# Patient Record
Sex: FEMALE | Race: WHITE
[De-identification: names, ages, dates, MRNs, and addresses within clinical notes are randomized per-mention and may not be internally consistent; named-entity substitution may affect disease eponyms.]

---

## 2019-01-25 NOTE — EKG REPORT
SEVERITY:- BORDERLINE ECG -

SINUS RHYTHM

BORDERLINE LEFT AXIS DEVIATION

BORDERLINE T ABNORMALITIES, ANTERIOR LEADS

:

Confirmed by: Veronica Lam MD 25-Jan-2019 22:54:57

## 2019-01-25 NOTE — PROGRESS NOTE
Provider Note


Provider Note: 





Patient was scheduled for an EGD and colonoscopy today.


she went to ED with concerns about her potassium.


I was made aware that patient was in ED by Dr Pereira who is in charge of 

Anesthesia today.


I was told that her potassium was low, Dr Pereira stated that patient will be 

having an EKG and that he is checking her labs and that it would be replaced.


A little after that, Dr Pereira informed me that patient had expressed the fact 

that she did not want to proceed with her procedures due to her significant 

anxiety;


the decision was then made to cancel her procedure..


will see the patient as an outpatient, but I do suspect that this same scenario 

will be repeated at the next attempt.

## 2019-01-25 NOTE — ER DOCUMENT REPORT
ED General





- General


Chief Complaint: Anxiety


Stated Complaint: ANXIETY


Time Seen by Provider: 01/25/19 09:34


Primary Care Provider: 


ERLINDA COLLINS [Primary Care Provider] - Follow up as needed


TRAVEL OUTSIDE OF THE [place] IN LAST 30 DAYS: No





- HPI


Patient complains to provider of: Anxiety cramping


Notes: 





Patient was scheduled for a colonoscopy EGD earlier this morning patient states 

she has been performed the prep however woke up this morning very anxious also 

having some cramping in her hands.  Patient states she had a small amount of 

vomiting early this morning patient states she did complete the entire prep.  

Patient otherwise is resting comfortably upon my evaluation.  Patient states she

was recently started on potassium supplements as that her doctor performed lab 

work showing a potassium of 2.  Patient states she states 10 mEq a day however 

has not taken those in the last 3 days.  Patient was to be no obvious distress





- Related Data


Allergies/Adverse Reactions: 


                                        





aspirin Allergy (Severe, Verified 01/25/19 08:40)


   Hemorrhage


montelukast [From Singulair] Allergy (Severe, Verified 01/25/19 08:40)


   


valsartan [From Diovan] Allergy (Severe, Verified 01/25/19 08:40)


   


levofloxacin [From Levaquin] Allergy (Intermediate, Verified 01/25/19 08:40)


   VOMITING


paroxetine [From Paxil] Allergy (Intermediate, Verified 01/25/19 08:40)


   Hallucinations


COSTUME JEWELRY METAL Allergy (Uncoded 01/25/19 08:40)


   











Past Medical History





- Social History


Smoking Status: Never Smoker


Family History: Reviewed & Not Pertinent


Patient has suicidal ideation: No


Patient has homicidal ideation: No





- Past Medical History


Cardiac Medical History: Reports: Hx Hypertension


   Denies: Hx Coronary Artery Disease, Hx Heart Attack


Pulmonary Medical History: Reports: Hx Asthma - ALLERGEN


   Denies: Hx Bronchitis, Hx COPD, Hx Pneumonia


Neurological Medical History: Denies: Hx Cerebrovascular Accident, Hx Seizures


Renal/ Medical History: Denies: Hx Peritoneal Dialysis


Musculoskeletal Medical History: Denies Hx Arthritis





- Immunizations


Hx Diphtheria, Pertussis, Tetanus Vaccination: Yes - NOT UP TO DATE





Review of Systems





- Review of Systems


Constitutional: No symptoms reported


EENT: No symptoms reported


Cardiovascular: No symptoms reported


Respiratory: No symptoms reported


Gastrointestinal: No symptoms reported


Genitourinary: No symptoms reported


Female Genitourinary: No symptoms reported


Musculoskeletal: No symptoms reported


Skin: No symptoms reported


Hematologic/Lymphatic: No symptoms reported


Neurological/Psychological: Anxiety, Other - Cramping


-: Yes All other systems reviewed and negative





Physical Exam





- Vital signs


Vitals: 


                                        











Temp Pulse Resp BP Pulse Ox


 


 97.7 F   158 H  16   140/72 H  100 


 


 01/25/19 08:43  01/25/19 08:43  01/25/19 08:43  01/25/19 08:43  01/25/19 08:43











Interpretation: Normal





- General


General appearance: Appears well, Alert





- HEENT


Head: Normocephalic, Atraumatic


Eyes: Normal


Pupils: PERRL





- Respiratory


Respiratory status: No respiratory distress


Chest status: Nontender


Breath sounds: Normal


Chest palpation: Normal





- Cardiovascular


Rhythm: Regular


Heart sounds: Normal auscultation


Murmur: No





- Abdominal


Inspection: Normal


Distension: No distension


Bowel sounds: Normal


Tenderness: Nontender


Organomegaly: No organomegaly





- Back


Back: Normal, Nontender





- Extremities


General upper extremity: Normal inspection, Nontender, Normal color, Normal ROM,

Normal temperature


General lower extremity: Normal inspection, Nontender, Normal color, Normal ROM,

Normal temperature, Normal weight bearing.  No: Darrell's sign





- Neurological


Neuro grossly intact: Yes


Cognition: Normal


Orientation: AAOx4


[place] Coma Scale Eye Opening: Spontaneous


[place] Coma Scale Verbal: Oriented


[place] Coma Scale Motor: Obeys Commands


[place] Coma Scale Total: 15


Speech: Normal


Motor strength normal: LUE, RUE, LLE, RLE


Sensory: Normal





- Psychological


Associated symptoms: Normal affect, Normal mood





- Skin


Skin Temperature: Warm


Skin Moisture: Dry


Skin Color: Normal





Course





- Re-evaluation


Re-evalutation: 





01/25/19 15:24


Laboratory studies show a low potassium of 3 and a magnesium of 1.4.  Patient 

was given oral potassium and intravenous magnesium as that we were notified by 

the GI and anesthesia team the patient would not be undergoing her EGD or 

colonoscopy today because these a little abnormalities.  At the replacement 

patient feeling a symptomatic patient was encouraged to continue her home 

medications we will add mag to her medication regiment for repletion patient 

discharged home





- Vital Signs


Vital signs: 


                                        











Temp Pulse Resp BP Pulse Ox


 


 98.2 F   76   16   136/74 H  100 


 


 01/25/19 13:37  01/25/19 13:37  01/25/19 13:37  01/25/19 13:37  01/25/19 13:37














- Laboratory


Result Diagrams: 


                                 01/25/19 09:28





                                 01/25/19 09:28


Laboratory results interpreted by me: 


                                        











  01/25/19 01/25/19 01/25/19





  09:28 09:28 09:28


 


Seg Neutrophils %  80.0 H  


 


Lymphocytes %  11.8 L  


 


Sodium   136.4 L 


 


Potassium   3.0 L* 


 


Chloride   93 L 


 


Est GFR (Non-Af Amer)   58 L 


 


Glucose   144 H 


 


Magnesium    1.2 L*














Discharge





- Discharge


Clinical Impression: 


 Hypokalemia, Hypomagnesemia





Condition: Good


Disposition: HOME, SELF-CARE


Instructions:  Anxiety ([facility]), Hypokalemia ([facility])


Additional Instructions: 


Your evaluation today shows low potassium and low magnesium exam tabs as 

prescribed please continue with your potassium replacement tablets as previous 

prescribed by your physician follow-up with your GI specialist return to the ER 

symptoms worsen.


Prescriptions: 


Magnesium Oxide [Mag-Ox 400 mg Tablet] 400 mg PO DAILY #30 tablet


Forms:  Parent Work Note, Return to School


Referrals: 


ERLINDA COLLINS [Primary Care Provider] - Follow up as needed

## 2019-03-04 NOTE — ER DOCUMENT REPORT
ED General





- General


Chief Complaint: Other


Stated Complaint: ABNORMAL LABS


Time Seen by Provider: 03/03/19 21:59


Primary Care Provider: 


GUZMAN MULLINS NP [Primary Care Provider] - Follow up in 3-5 days


Cannot obtain history due to: Other - Very poor historian


Notes: 





Patient is a 53-year-old female with a past medical history of essential 

hypertension, presents with a multitude of complaints.  It is difficult to 

ascertain what exactly is bringing the patient to the emergency department ada kiran.  As I sit in the room the patient continues to add additional complaints 

that were not initially presented to nursing staff.  Initially the patient was 

complaining to me primarily of urinating frequently throughout the day today.  

She denies any associated dysuria, abdominal pain, flank pain, hematuria or 

burning.  She states only that she is concerned that she is peeing so often.  

Nothing seems to improve or worsen the symptom.  Denies anything similar in the 

past.  The patient then speaks at length about her history of low potassium and 

low magnesium earlier this month.  For several minutes thereafter she tells me 

about the various types of potassium and magnesium that she has tried taking in 

conjunction with her primary care doctor.  She then tells me that she is 

concerned about bilateral lower extremity edema but then notes that this has 

also gone away.  She also tells me that she has felt fatigued, has had some 

intermittent headaches, states that she is very concerned about her potassium 

and magnesium levels.  She also relates that she has a history of significant 

anxiety, has a long-standing history of multiple medicine allergies, tells me 

that she drove to the hospital several weeks ago and sat in the parking lot for 

an hour after taking her first dose of buspirone "because I was sure I can have 

an allergic reaction but I guess I did not".  Patient is unable to identify 

exactly what made her come to the emergency department tonight.  She has not 

discussed her concerns with her primary care doctor today.


TRAVEL OUTSIDE OF THE [place] IN LAST 30 DAYS: No





- Related Data


Allergies/Adverse Reactions: 


                                        





aspirin Allergy (Severe, Verified 01/25/19 08:40)


   Hemorrhage


montelukast [From Singulair] Allergy (Severe, Verified 01/25/19 08:40)


   


valsartan [From Diovan] Allergy (Severe, Verified 01/25/19 08:40)


   


levofloxacin [From Levaquin] Allergy (Intermediate, Verified 01/25/19 08:40)


   VOMITING


paroxetine [From Paxil] Allergy (Intermediate, Verified 01/25/19 08:40)


   Hallucinations


COSTUME JEWELRY METAL Allergy (Uncoded 01/25/19 08:40)


   











Past Medical History





- General


Information source: Patient





- Social History


Smoking Status: Never Smoker


Frequency of alcohol use: None


Drug Abuse: None


Lives with: Spouse/Significant other


Family History: Reviewed & Not Pertinent


Patient has suicidal ideation: No


Patient has homicidal ideation: No





- Past Medical History


Cardiac Medical History: Reports: Hx Hypertension


   Denies: Hx Coronary Artery Disease, Hx Heart Attack


Pulmonary Medical History: Reports: Hx Asthma - ALLERGEN


   Denies: Hx Bronchitis, Hx COPD, Hx Pneumonia


Neurological Medical History: Denies: Hx Cerebrovascular Accident, Hx Seizures


Renal/ Medical History: Denies: Hx Peritoneal Dialysis


Musculoskeletal Medical History: Denies Hx Arthritis





- Immunizations


Hx Diphtheria, Pertussis, Tetanus Vaccination: Yes - NOT UP TO DATE





Review of Systems





- Review of Systems


Notes: 





Constitutional: Negative for fever.


HENT: Negative for sore throat.


Eyes: Negative for visual changes.


Cardiovascular: Negative for chest pain.


Respiratory: Negative for shortness of breath.


Gastrointestinal: Negative for abdominal pain, vomiting or diarrhea.


Genitourinary: Negative for dysuria.


Musculoskeletal: Positive for leg swelling


Skin: Negative for rash.


Neurological: Negative for headaches, weakness or numbness.





10 point ROS negative except as marked above and in HPI.





Physical Exam





- Vital signs


Vitals: 


                                        











Temp Pulse Resp BP Pulse Ox


 


 97.7 F   69   16   188/97 H  99 


 


 03/03/19 21:29  03/03/19 21:29  03/03/19 21:29  03/03/19 21:29  03/03/19 21:29











Interpretation: Hypertensive


Notes: 





PHYSICAL EXAMINATION:





GENERAL: Well-appearing, well-nourished and in no acute distress.





HEAD: Atraumatic, normocephalic.





EYES: Pupils equal round and reactive to light, extraocular movements intact, 

sclera anicteric, conjunctiva are normal.





ENT: nares patent, oropharynx clear without exudates.  Moist mucous membranes.





NECK: Normal range of motion, supple without lymphadenopathy





LUNGS: Breath sounds clear to auscultation bilaterally and equal.  No wheezes 

rales or rhonchi.





HEART: Regular rate and rhythm without murmurs





ABDOMEN: Soft, nontender, normoactive bowel sounds.  No guarding, no rebound.  

No masses appreciated.





EXTREMITIES: Normal range of motion, no pitting or edema.  No cyanosis.





NEUROLOGICAL: No focal neurological deficits. Moves all extremities spontaneous

ly and on command.





PSYCH: Normal mood, normal affect.





SKIN: Warm, Dry, normal turgor, no rashes or lesions noted.





Course





- Re-evaluation


Re-evalutation: 





03/04/19 00:42


Patient presents with multiple vague complaints that did not appear to be 

concerning for any acute life-threatening pathology.  Vitals are within normal 

limits at triage and at time of discharge.  Physical examination is 

unremarkable.  Patient has tolerated oral intake without difficulty.  Patient 

was not noted to be in distress at any point during their ER visit.  At this 

time, based on the reassuring evaluation, I do not suspect an acute MI, 

pulmonary embolus, aortic dissection, acute intra-abdominal pathology, stroke, 

or sepsis.Will discharge with return precautions and follow-up recommendations. 

Verbal discharge instructions given a the bedside and opportunity for questions 

given. Medication warnings reviewed. Patient is in agreement with this plan and 

has verbalized understanding of return precautions and the need for primary care

follow-up in the next 24-72 hours.





- Vital Signs


Vital signs: 


                                        











Temp Pulse Resp BP Pulse Ox


 


 98.7 F   68   19   171/92 H  97 


 


 03/04/19 00:47  03/03/19 22:00  03/04/19 00:09  03/04/19 00:09  03/04/19 00:09














- Laboratory


Result Diagrams: 


                                 03/03/19 22:25





Discharge





- Discharge


Clinical Impression: 


 Essential hypertension, Multiple complaints





Condition: Stable


Disposition: HOME, SELF-CARE


Additional Instructions: 


Please return to the emergency room immediately if you experience any concerning

symptoms including high fevers, severe headache, chest pain, difficulty 

breathing, abdominal pain, slurred speech, numbness or weakness in your arms or 

legs, or any other symptom that concerns you.


Referrals: 


GUZMAN MULLINS NP [Primary Care Provider] - Follow up in 3-5 days

## 2019-03-09 NOTE — ER DOCUMENT REPORT
ED General





- General


Chief Complaint: Blood Pressure Problem


Stated Complaint: BLOOD PRESSURE PROBLEMS


Time Seen by Provider: 03/09/19 02:50


Primary Care Provider: 


GUZMAN MULLINS NP [Primary Care Provider] - Follow up as needed


Notes: 





Patient is a 53-year old female with a past medical history of essential 

hypertension, anxiety, pressure.  Patient states that she brought a blood 

pressure cuff today, checked her blood pressure every 2 hours for the past 10 

hours, found her readings to be high and became concerned coming to the 

emergency department.  Denies any associated symptoms.  Has been taking her 

blood pressure medications as prescribed.  Nothing seemed to improve or worsen 

her blood pressure although she notes more she took a higher got.  Does note 

associated anxiety.  Denies any chest pain, shortness of breath or palpitations.

 Has a long-standing history of hypertension.


TRAVEL OUTSIDE OF THE [place] IN LAST 30 DAYS: No





- Related Data


Allergies/Adverse Reactions: 


                                        





aspirin Allergy (Severe, Verified 01/25/19 08:40)


   Hemorrhage


montelukast [From Singulair] Allergy (Severe, Verified 01/25/19 08:40)


   


valsartan [From Diovan] Allergy (Severe, Verified 01/25/19 08:40)


   


levofloxacin [From Levaquin] Allergy (Intermediate, Verified 01/25/19 08:40)


   VOMITING


paroxetine [From Paxil] Allergy (Intermediate, Verified 01/25/19 08:40)


   Hallucinations


COSTUME JEWELRY METAL Allergy (Uncoded 01/25/19 08:40)


   











Past Medical History





- General


Information source: Patient





- Social History


Smoking Status: Never Smoker


Frequency of alcohol use: None


Drug Abuse: None


Lives with: Spouse/Significant other


Family History: Reviewed & Not Pertinent





- Past Medical History


Cardiac Medical History: Reports: Hx Hypertension


   Denies: Hx Coronary Artery Disease, Hx Heart Attack


Pulmonary Medical History: Reports: Hx Asthma - ALLERGEN


   Denies: Hx Bronchitis, Hx COPD, Hx Pneumonia


Neurological Medical History: Denies: Hx Cerebrovascular Accident, Hx Seizures


Renal/ Medical History: Denies: Hx Peritoneal Dialysis


Musculoskeletal Medical History: Denies Hx Arthritis





- Immunizations


Hx Diphtheria, Pertussis, Tetanus Vaccination: Yes - NOT UP TO DATE





Review of Systems





- Review of Systems


Notes: 





Constitutional: Negative for fever.


HENT: Negative for sore throat.


Eyes: Negative for visual changes.


Cardiovascular: Negative for chest pain.


Respiratory: Negative for shortness of breath.


Gastrointestinal: Negative for abdominal pain, vomiting or diarrhea.


Genitourinary: Negative for dysuria.


Musculoskeletal: Negative for back pain.


Skin: Negative for rash.


Neurological: Negative for headaches, weakness or numbness.





10 point ROS negative except as marked above and in HPI.





Physical Exam





- Vital signs


Vitals: 





                                        











Temp Pulse Resp BP Pulse Ox


 


 98.2 F   73   18   159/94 H  99 


 


 03/09/19 01:18  03/09/19 01:18  03/09/19 01:18  03/09/19 01:18  03/09/19 01:18











Interpretation: Normal


Notes: 





PHYSICAL EXAMINATION:





GENERAL: Well-appearing, well-nourished and in no acute distress.





HEAD: Atraumatic, normocephalic.





EYES: Pupils equal round and reactive to light, extraocular movements intact, 

sclera anicteric, conjunctiva are normal.





ENT: nares patent, oropharynx clear without exudates.  Moist mucous membranes.





NECK: Normal range of motion, supple without lymphadenopathy





LUNGS: Breath sounds clear to auscultation bilaterally and equal.  No wheezes 

rales or rhonchi.





HEART: Regular rate and rhythm without murmurs





ABDOMEN: Soft, nontender, normoactive bowel sounds.  No guarding, no rebound.  

No masses appreciated.





EXTREMITIES: Normal range of motion, no pitting or edema.  No cyanosis.





NEUROLOGICAL: No focal neurological deficits. Moves all extremities 

spontaneously and on command.





PSYCH: Normal mood, normal affect.





SKIN: Warm, Dry, normal turgor, no rashes or lesions noted.





Course





- Re-evaluation


Re-evalutation: 





03/09/19 03:30


Presentation of asymptomatic hypertension. Patient denies any symptoms 

concerning for SAH, dissection, MI, or encephalopaty. Alert, oriented, and 

denies any symptoms at time of assessment. Normal neuro exam. Per ACE[facility] policy 

guidelines, will therefore not obtain any labs or EKG at this time and will not 

initiate new BP treatment. I have discussed critical importance of follow up 

with PCP within 1 week and increased risk of devastating stroke, heart attack, 

respiratory distress, and other life threatening complications if blood pressure

is not reduced appropriately. Diet and exercise habits also discussed. Patient 

will be discharged with return precautions and follow-up recommendations.





- Vital Signs


Vital signs: 





                                        











Temp Pulse Resp BP Pulse Ox


 


 98.2 F   66   18   188/89 H  99 


 


 03/09/19 01:18  03/09/19 02:37  03/09/19 01:18  03/09/19 02:37  03/09/19 01:18














Discharge





- Discharge


Clinical Impression: 


 Essential hypertension





Condition: Good


Disposition: HOME, SELF-CARE


Additional Instructions: 


You were seen today  for blood pressure that was high.  This is a long-term risk

factor for multiple medical problems including heart attack and stroke.  

However, the blood pressure in of itself will not cause you to have an acute 

stroke or heart attack over the course of just several days or weeks.  You need 

to have a gradual reduction of your blood pressure back to normal levels over 

the next several months in conjunction with your primary care physician.   

Return if you develop headache, weakness, numbness, chest pain, pass out, or 

have any other symptoms that are concerning to you.


Referrals: 


GUZMAN MULLINS NP [Primary Care Provider] - Follow up as needed

## 2019-10-22 ENCOUNTER — HOSPITAL ENCOUNTER (OUTPATIENT)
Dept: HOSPITAL 62 - WI | Age: 54
End: 2019-10-22
Attending: PHYSICIAN ASSISTANT
Payer: COMMERCIAL

## 2019-10-22 DIAGNOSIS — Z12.31: Primary | ICD-10-CM

## 2019-10-22 PROCEDURE — 77067 SCR MAMMO BI INCL CAD: CPT

## 2019-10-23 NOTE — WOMENS IMAGING REPORT
EXAM DESCRIPTION:  BILAT SCREENING MAMMO W/CAD



COMPLETED DATE/TIME:  10/22/2019 3:21 pm



REASON FOR STUDY:  Z12.31 SCREENING MAMMO Z12.31  ENCNTR SCREEN MAMMOGRAM FOR MALIGNANT NEOPLASM OF B
RE



COMPARISON:  None.



EXAM PARAMETERS:  Standard craniocaudal and mediolateral oblique views of each breast recorded using 
digital acquisition.

Read with the assistance of CAD.

.Pending sale to [facility] [employer]  Version 9.2



LIMITATIONS:  None.



FINDINGS:  No suspicious masses, suspicious calcifications or architectural distortion. No areas of c
oncern.



IMPRESSION:  Negative MAMMOGRAM.  BIRADS 1



BREAST DENSITY:  a. The breasts are almost entirely fatty.



BIRAD:  ASSESSMENT:  1 NEGATIVE



RECOMMENDATION:  ROUTINE SCREENING

Please continue yearly bilateral screening mammography/tomosynthesis in October 2020



COMMENT:  The patient has been notified of the results by letter per MQSA requirements. Additional no
tification policies are in place for contacting patient with suspicious or incomplete findings.

Quality ID #225: The American College of Radiology recommends an annual screening mammogram for women
 aged 40 years or over. This facility utilizes a reminder system to ensure that all patients receive 
reminder letters, and/or direct phone calls for appointments. This includes reminders for routine scr
eening mammograms, diagnostic mammograms, or other Breast Imaging Interventions when appropriate.  Th
is patient will be placed in the appropriate reminder system.



TECHNICAL DOCUMENTATION:  FINDING NUMBER: (1)

ASSESSMENT: (1)

JOB ID:  [id number]

 2011 [employer]- All Rights Reserved



Reading location - IP/workstation name: [email or web address]

## 2020-12-30 ENCOUNTER — HOSPITAL ENCOUNTER (OUTPATIENT)
Dept: HOSPITAL 62 - WI | Age: 55
End: 2020-12-30
Attending: PHYSICIAN ASSISTANT
Payer: COMMERCIAL

## 2020-12-30 DIAGNOSIS — Z12.31: Primary | ICD-10-CM

## 2020-12-30 PROCEDURE — 77063 BREAST TOMOSYNTHESIS BI: CPT

## 2020-12-30 PROCEDURE — 77067 SCR MAMMO BI INCL CAD: CPT

## 2020-12-30 NOTE — WOMENS IMAGING REPORT
EXAM DESCRIPTION:  3D SCREENING MAMMO BILAT



IMAGES COMPLETED DATE/TIME:  12/30/2020 2:16 pm



REASON FOR STUDY:  ROUTINE SCREENING MAMMOGRAM Z12.31 Z12.31  ENCNTR SCREEN MAMMOGRAM FOR MALIGNANT N
EOPLASM OF FROY



COMPARISON:  2019



EXAM PARAMETERS:  Views: Standard craniocaudal and mediolateral oblique views of each breast recorded
 using digital acquisition and breast tomosynthesis.

Read with the assistance of CAD.

.[facility]  Version 9.2



LIMITATIONS:  None.



FINDINGS:  No suspicious masses, suspicious calcifications or architectural distortion. No areas of c
oncern.



IMPRESSION:   NEGATIVE MAMMOGRAM. BIRADS 1.



BREAST DENSITY:  b. There are scattered areas of fibroglandular density.



BIRAD:  ASSESSMENT:  1 NEGATIVE



RECOMMENDATION:  ROUTINE SCREENING



COMMENT:  The patient has been notified of the results by letter per MQSA requirements. Additional no
tification policies are in place for contacting patient with suspicious or incomplete findings.

Quality ID #225: The American College of Radiology recommends an annual screening mammogram for women
 aged 40 years or over. This facility utilizes a reminder system to ensure that all patients receive 
reminder letters, and/or direct phone calls for appointments. This includes reminders for routine scr
eening mammograms, diagnostic mammograms, or other Breast Imaging Interventions when appropriate.  Th
is patient will be placed in the appropriate reminder system.



TECHNICAL DOCUMENTATION:  FINDING NUMBER: (1)

ASSESSMENT:  (1)

JOB ID:  [id number]

 2011 [employer]- All Rights Reserved



Reading location - IP/workstation name: [email or web address]